# Patient Record
Sex: FEMALE | Race: WHITE | Employment: FULL TIME | URBAN - METROPOLITAN AREA
[De-identification: names, ages, dates, MRNs, and addresses within clinical notes are randomized per-mention and may not be internally consistent; named-entity substitution may affect disease eponyms.]

---

## 2017-07-24 ENCOUNTER — HOSPITAL ENCOUNTER (OUTPATIENT)
Dept: PHYSICAL THERAPY | Age: 37
Discharge: HOME | End: 2017-07-24

## 2017-07-24 NOTE — PROGRESS NOTES
Claudette Form  :  [id number] [facility],[address]  [address] [place] [address].  [phone]   [phone]        OUTPATIENT PHYSICAL THERAPY:{Note JMIQ:25589359} 2017      ICD-10: Treatment Diagnosis: ***  Precautions/Allergies:   Review of patient's allergies indicates no known allergies. Fall Risk Score: {NUMBERS 1-10:98091} (? 5 = High Risk)  MD Orders: Eval and Treat  MEDICAL/REFERRING DIAGNOSIS:  Acute intractable tension-type headache [G44.201]  Neck pain [M54.2]   DATE OF ONSET: ***  REFERRING PHYSICIAN: Gia Cottrell DO  RETURN PHYSICIAN APPOINTMENT: ***     INITIAL ASSESSMENT:  Ms. Paola Chambers presents ***    PROBLEM LIST (Impacting functional limitations):  1. {PT Problem List:92526744} INTERVENTIONS PLANNED:  1. {PT Interventions:91655809}   TREATMENT PLAN:  Effective Dates: *** TO ***. Frequency/Duration: { :05504} for { :42995::\"12 weeks\"}  GOALS: (Goals have been discussed and agreed upon with patient.)  Short-Term Functional Goals: Time Frame: ***  1. ***  Discharge Goals: Time Frame: ***  1. ***  Rehabilitation Potential For Stated Goals: { :70952::\"***\"}  Regarding Lawanda Anne's therapy, I certify that the treatment plan above will be carried out by a therapist or under their direction. Thank you for this referral,  Key Calles, PT, DPT       Referring Physician Signature: Val Olmos DO              Date                      HISTORY:   History of Present Injury/Illness (Reason for Referral):  ***  Past Medical History/Comorbidities:   Ms. Paola Chambers  has a past medical history of Ocular migraine. Ms. Paola Chambers  has a past surgical history that includes breast surgery procedure unlisted.   Social History/Living Environment:    ***  Prior Level of Function/Work/Activity:  ***  Dominant Side:         {LEFT RIGHT CAP:11405}  Other Clinical Tests:          ***  Previous Treatment Approaches:          ***  Personal Factors:    {PT Personal Factors:08753974}  Current Medications:    Current Outpatient Prescriptions:     tiZANidine (ZANAFLEX) 2 mg tablet, Take 1 Tab by mouth nightly as needed. , Disp: 30 Tab, Rfl: 0    nabumetone (RELAFEN) 500 mg tablet, Take 1 Tab by mouth two (2) times daily as needed for Pain., Disp: 60 Tab, Rfl: 1    desogestrel-ethinyl estradiol (APRI) 0.15-0.03 mg tab, Take 1 Tab by mouth daily. , Disp: , Rfl:     Cetirizine (ZYRTEC) 10 mg cap, Take 10 mg by mouth daily as needed. , Disp: , Rfl:    Date Last Reviewed:  7/24/2017   # of Personal Factors/Comorbidities that affect the Plan of Care: {Personal Factors/Comorbidities:01013224}   EXAMINATION:   Observation/Orthostatic Postural Assessment:          ***  Palpation:          ***  ROM:     Cervical       Flexion:      Extension:      R SB:      L SB:      R rotation:      L rotation:   Shoulder   Strength:     Cervical      Flexion:      Extension:      R SB:      L SB:      R rotation:      L rotation:   Shoulder    Special Tests:          ***  Neurological Screen:  {PT Neurological Elements:24999509}  Functional Mobility:         ***  Balance:          ***   Body Structures Involved:  1. {PT body Structures:91249648} Body Functions Affected:  1. {PT Body Functions:74067161} Activities and Participation Affected:  1. {PT Act/Part:32659853}   # of elements that affect the Plan of Care: {Examination:15994224}   CLINICAL PRESENTATION:   Presentation: {Presentation:11347374}   CLINICAL DECISION MAKING:   Outcome Measure: Tool Used: Neck Disability Index (NDI)  Score:  Initial: ***/50  Most Recent: X/50 (Date: -- )   Interpretation of Score: The Neck Disability Index is a revised form of the Oswestry Low Back Pain Index and is designed to measure the activities of daily living in person's with neck pain. Each section is scored on a 0-5 scale, 5 representing the greatest disability. The scores of each section are added together for a total score of 50.    Score 0 1-10 40 41-49 50   Modifier CH CI CJ CK CL CM CN     {PT/OT G-CODES:54367703}    Medical Necessity:   · {MEDICAL NECESSITY:96136369}  Reason for Services/Other Comments:  · {CONTINUATION:79267130}   Use of outcome tool(s) and clinical judgement create a POC that gives a: {Clinical Decision-Makin}   TREATMENT:   (In addition to Assessment/Re-Assessment sessions the following treatments were rendered)  THERAPEUTIC EXERCISE: (see below for minutes):  Exercises per grid below to improve {PT MOBILITY:43114353}. Required {NO/MIN/MOD/MAX:41792} {CUES:59907418} cues to {PT ALIGNMENT:63045467}. Progressed {desc:11427835} as indicated. MANUAL THERAPY: (see below for minutes): {MANUAL THERAPY:40632461} was utilized and necessary because of the patient's {MANUAL THERAPY 8:34392860}. MODALITIES: (see below for minutes): {PT MODALITIES:22287042}   MECHANICAL TRACTION: (see below for minutes): Traction was used due to the patient's {PT MECHANICAL TRACTION:72193371} in order to relieve pain in or originating from his spine. Date: ***       Modalities:        ***                        Therapeutic Exercise:        ***                                                Proprioceptive Activities:        ***                        Manual Therapy:        ***                Functional Activities:        ***                                HEP: ***  Treatment/Session Assessment:  ***. · Pain/ Symptoms: Initial:   *** Post Session:  *** ·   Compliance with Program/Exercises: { :733419::\"Will assess as treatment progresses\"}. · Recommendations/Intent for next treatment session: \"Next visit will focus on {MedNecessity:92422963}\".   Total Treatment Duration:        José Miguel Gudino, PT, DPT

## 2017-07-24 NOTE — PROGRESS NOTES
Ambulatory/Rehab Services H2 Model Falls Risk Assessment    Risk Factor Pts. ·   Confusion/Disorientation/Impulsivity  []    4 ·   Symptomatic Depression  []   2 ·   Altered Elimination  []   1 ·   Dizziness/Vertigo  []   1 ·   Gender (Male)  []   1 ·   Any administered antiepileptics (anticonvulsants):  []   2 ·   Any administered benzodiazepines:  []   1 ·   Visual Impairment (specify):  []   1 ·   Portable Oxygen Use  []   1 ·   Orthostatic ? BP  []   1 ·   History of Recent Falls (within 3 mos.)  []   5     Ability to Rise from Chair (choose one) Pts. ·   Ability to rise in a single movement  []   0 ·   Pushes up, successful in one attempt  []   1 ·   Multiple attempts, but successful  []   3 ·   Unable to rise without assistance  []   4   Total: (5 or greater = High Risk) ***     Falls Prevention Plan:   []                Physical Limitations to Exercise (specify):   []                Mobility Assistance Device (type):   []                Exercise/Equipment Adaptation (specify):    ©2010 [facility] of [address] Patent #[id number].  Federal Law prohibits the replication, distribution or use without written permission from [facility] [employer]

## 2017-07-28 ENCOUNTER — APPOINTMENT (OUTPATIENT)
Dept: PHYSICAL THERAPY | Age: 37
End: 2017-07-28

## 2021-08-25 ENCOUNTER — HOSPITAL ENCOUNTER (OUTPATIENT)
Dept: PHYSICAL THERAPY | Age: 41
Discharge: HOME | End: 2021-08-25
Payer: COMMERCIAL

## 2021-08-25 DIAGNOSIS — M75.41 IMPINGEMENT SYNDROME OF RIGHT SHOULDER: ICD-10-CM

## 2021-08-25 DIAGNOSIS — M75.42 IMPINGEMENT SYNDROME OF LEFT SHOULDER: ICD-10-CM

## 2021-08-25 PROCEDURE — 97110 THERAPEUTIC EXERCISES: CPT

## 2021-08-25 PROCEDURE — 97162 PT EVAL MOD COMPLEX 30 MIN: CPT

## 2021-08-25 NOTE — PROGRESS NOTES
Karen Hassan  : 1980  Primary: Christine Wick Of Sari Monge*  Secondary:  Yaakov Galvan @ [address]  [address]  [phone]   [phone]      OUTPATIENT PHYSICAL THERAPY: Daily Treatment Note  2021   ICD-10: Treatment Diagnosis: Pain in right arm (M79.601), Pain in left arm (M79.602), Stiffness of right shoulder, not elsewhere classified (M25.611), Stiffness of left shoulder, not elsewhere classified (M25.612), Pain in right upper arm (M79.621) and Pain in left upper arm (B66.111)  MEDICAL/REFERRING DIAGNOSIS:  Impingement syndrome of right shoulder [M75.41]  Impingement syndrome of left shoulder [M75.42]    TREATMENT PLAN:  Effective Dates: 2021 TO 10/24/2021 (60 days). Frequency/Duration: 3 times a week for 60 Days  GOALS: (Goals have been discussed and agreed upon with patient.)  Short-Term Functional Goals: Time Frame: 3 weeks  1. Independent performance of home exercise program  2. Ability to reach behind back with either arm to mid thorax without pain for personal care/dressing/washing back. Discharge Goals: Time Frame: 7 weeks  1. Pt to demonstrate ability to reach/lift overhead with either arm with 10# for reaching to tall shelves   2. Pt to demonstrate sleep on either side without pain interruption. 3. Pt to demonstrate Quick-DASH score < 13 to reflect return to normal function  Rehabilitation Potential For Stated Goals: Good  _________________________________________________________________________  Pre-treatment Symptoms/Complaints:  See initial evaluation report  Pain: Initial: 10 Post Session:  No incrase/10   Medications Last Reviewed:  2021  Updated Objective Findings:  Below measures from initial evaluation unless otherwise noted. TREATMENT:   THERAPEUTIC ACTIVITY: ( see below for minutes): Therapeutic activities per grid below to improve mobility, strength, balance and coordination. Required minimal visual, verbal, manual and tactile cues to improve independence and safety with daily activities . THERAPEUTIC EXERCISE: (see below for minutes):  Exercises per grid below to improve mobility, strength, balance and coordination. Required minimal verbal and manual cues to promote proper body alignment, promote proper body posture and promote proper body mechanics. Progressed resistance, range, repetitions and complexity of movement as indicated. MANUAL THERAPY: (see below for minutes): Joint mobilization and Soft tissue mobilization was utilized and necessary because of the patient's restricted joint motion, painful spasm, loss of articular motion and restricted motion of soft tissue. MODALITIES: (see below for minutes):      to decrease pain    SELF CARE: (see below for minutes): Procedure(s) (per grid) utilized to improve and/or restore self-care/home management as related to dressing, bathing and grooming. Required minimal verbal cueing to facilitate activities of daily living skills and compensatory activities. Date: 8/25/2021 (visit 1)       Modalities:                                Therapeutic Exercise: 23 min        Shoulder flexion stretch bilat: supine 10 x 10\"        Shoulder ER stretch (hands behind head) 10 x 10\"        Posterior cuff stretch: 2 x 30\" left/ right - sitting        Sitting shoulder ER W's: 2 x 10        Written HEP - see notes - all ex's reviewed               Proprioceptive Activities:                                Manual Therapy:                        Therapeutic Activities:                                  HEP:Access Code: [email or web address]  URL: [email or web address]  Date: 08/25/2021  Prepared by: Pauline Willoughby    Exercises  Supine Shoulder Flexion Extension Full Range AROM - 2 x daily - 10 reps - 10 second hold  Supine Chest Stretch with Elbows Bent - 2 x daily - 3 sets - 10 reps - 5 hold  Standing Shoulder Posterior Capsule Stretch - 2 x daily - 5 reps - 30 hold  Shoulder W - External Rotation with Resistance - 2 x daily - 3 sets - 10 reps      MedBridge Portal  Treatment/Session Summary:    · Response to Treatment:  Patient is independent with performance of above described home program.  · Communication/Consultation:  None today  · Equipment provided today:  Theraband red CLX strip for home exercise program  · Recommendations/Intent for next treatment session: Next visit will focus on End range shoulder ROM, rotator cuff endurance and stability training, HEP progression, Estim as needed for pain control.     Total Treatment Billable Duration:  40 min  PT Patient Time In/Time Out  Time In: 6947  Time Out: [address], PT    Future Appointments   Date Time Provider Elfego Salmeron   8/31/2021  3:30 PM Galileo Snow, PT [facility]   9/2/2021  3:30 PM Galileo Snow, PT [facility]   9/3/2021  3:30 PM Galileo Snow, PT [facility]   9/8/2021  3:30 PM Galileo Snow, PT [facility]   9/10/2021  3:30 PM Galileo Snow, PT [facility]   9/13/2021  3:30 PM Galileo Snow, PT [facility]   9/15/2021  3:30 PM Galileo Snow, PT [facility]   9/17/2021  3:30 PM Galileo Snow, PT [facility]   9/20/2021  3:30 PM Galileo Snow, PT [facility]   9/22/2021  3:30 PM Galileo Snow, PT [facility]   9/24/2021  3:30 PM Galileo Snow, PT [facility]   9/27/2021  3:30 PM Galileo Snow, PT [facility]   9/29/2021  3:30 PM Galileo Snow, PT [facility]   10/1/2021  3:30 PM Mariann Aguirre, PT [facility]

## 2021-08-25 NOTE — THERAPY EVALUATION
Esme Elena  : 1980 [address], [address] [place] [address]  [phone]   [phone]        OUTPATIENT PHYSICAL THERAPY:Initial Assessment 2021         ICD-10: Treatment Diagnosis: Pain in right arm (M79.601), Pain in left arm (M79.602), Stiffness of right shoulder, not elsewhere classified (M25.611), Stiffness of left shoulder, not elsewhere classified (M25.612), Pain in right upper arm (M79.621) and Pain in left upper arm (V78.494)  Precautions/Allergies:   Patient has no known allergies. Fall Risk Score:     Ambulatory/Rehab Services H2 Model Falls Risk Assessment    Risk Factors:       No Risk Factors Identified Ability to Rise from Chair:       (0)  Ability to rise in a single movement    Falls Prevention Plan:       No modifications necessary   Total: (5 or greater = High Risk): 0     [facility] of [employer]. All Rights Reserved. [facility] Patent #[id number]. Federal Law prohibits the replication, distribution or use without written permission from [facility] [employer]     MD Orders: Evaluate and treat: L and R shoulder impingement MEDICAL/REFERRING DIAGNOSIS:  Impingement syndrome of right shoulder [M75.41]  Impingement syndrome of left shoulder [M75.42]   DATE OF ONSET:   REFERRING PHYSICIAN: Rosalie Gaming DO  RETURN PHYSICIAN APPOINTMENT: TBD     INITIAL ASSESSMENT:  Ms. Shy Posey presents with bilateral shoulder pain and stiffness that hinders her ability to reach overhead and behind back with either arm to complete ADL's and personal care activities. She requires skilled physical therapy intervention to restore normal shoulder function and to teach self care strategies to prevent re-injury. PROBLEM LIST (Impacting functional limitations):  1. Decreased Strength  2. Increased Pain  3. Decreased Activity Tolerance  4. Decreased Flexibility/Joint Mobility  5.  Decreased [place] with Home Exercise Program INTERVENTIONS PLANNED:  1. Electrical Stimulation  2. Home Exercise Program (HEP)  3. Range of Motion (ROM)  4. Therapeutic Activites  5. Therapeutic Exercise/Strengthening    TREATMENT PLAN:  Effective Dates: 8/25/2021 TO 10/24/2021 (60 days). Frequency/Duration: 3 times a week for 60 Days  GOALS: (Goals have been discussed and agreed upon with patient.)  Short-Term Functional Goals: Time Frame: 3 weeks  1. Independent performance of home exercise program  2. Ability to reach behind back with either arm to mid thorax without pain for personal care/dressing/washing back. Discharge Goals: Time Frame: 7 weeks  1. Pt to demonstrate ability to reach/lift overhead with either arm with 10# for reaching to tall shelves   2. Pt to demonstrate sleep on either side without pain interruption. 3. Pt to demonstrate Quick-DASH score < 13 to reflect return to normal function  Rehabilitation Potential For Stated Goals: Good  Regarding Isatu Aparicio Dayana's therapy, I certify that the treatment plan above will be carried out by a therapist or under their direction. Thank you for this referral,  Oly Padilla, PT       Referring Physician Signature: Karime Palomino DO              Date                      HISTORY:   History of Present Injury/Illness (Reason for Referral):  Pt reports a history of having initially developed right shoulder pain associated with lying on her right side to rest in April 2021 - this progressed into stiffness over the next month, caused her to shift her sleep postures and led to her developing shoulder pain in her left side not. Currently she experiences bilateral shoulder pain as well as stiffness which hinders her ability to reach behind her back with either arm to wash. She also notices shoulder pressure/tightnes with simple exercises such as pushups. She has no history of shoulder trauma and denies any other contributing factors.  Her goals are to regain normal shoulder mobility without pain   Past Medical History/Comorbidities:   Ms. Carline Alba  has a past medical history of Ocular migraine. Ms. Carline Alba  has a past surgical history that includes pr breast surgery procedure unlisted. Social History/Living Environment:     Pt , one child at home  Prior Level of Function/Work/Activity:  Normal shoulder function. Runs for fitness, Her work is sedentary (desk job). Dominant Side:         RIGHT  Current Medications:    Current Outpatient Medications:     diclofenac EC (VOLTAREN) 75 mg EC tablet, Take 1 Tablet by mouth two (2) times daily as needed for Pain for up to 21 days. , Disp: 42 Tablet, Rfl: 0    crisaborole (EUCRISA) 2 % oint, by Apply Externally route as needed. , Disp: , Rfl:     triamcinolone acetonide (KENALOG) 0.1 % topical cream, Apply  to affected area two (2) times a day. use thin layer (Patient taking differently: Apply  to affected area two (2) times daily as needed. use thin layer), Disp: 45 g, Rfl: 1    Cetirizine (ZYRTEC) 10 mg cap, Take 10 mg by mouth daily as needed. , Disp: , Rfl:    Date Last Reviewed:  8/25/2021   # of Personal Factors/Comorbidities that affect the Plan of Care: 1-2: MODERATE COMPLEXITY   EXAMINATION:   Observation/Orthostatic Postural Assessment:    Bilateral scapular protraction posture. Palpation:    Humeral head tenderness bilaterally  ROM:    Left shoulder: flexion = 148 deg active (pain), 142 deg passive (pain), abduction = 156 deg active, External rotaiton = 67 deg active (arm at side0, 50 deg passive (arm abducted, pain at end range). IR = 68 deg. Apley reach tests: T1 ER, T6 IR w/ pain. Right shoulder: flexion = 155 deg active (pain), 160 deg passive (pain), abduction = 167 deg active, ER = 78 deg active, 68 deg passive (pain). Apley reach tests: T1/T9 (pain limited).    Strength:   Hand held dynamometer: flexion = 18.5# left, 17.9# right, External rotation: 9.7# left, 12. 5# right, Internal rotation: 10.5# left, 16.1 # right.  Special Tests:    + left and right: Neer's and Copeland Aristeo impingement test. Neg. Sulcus and load-shift tests. Neurological Screen:  Grossly intact light touch sensation bilat. UE's  Functional Mobility:   Normal performance of supine-sit-stand transfers and walking gait. Balance:    Normal static standing balance   Body Structures Involved:  1. Nerves  2. Bones  3. Joints  4. Muscles  5. Ligaments Body Functions Affected:  1. Sensory/Pain  2. Neuromusculoskeletal  3. Movement Related Activities and Participation Affected:  1. General Tasks and Demands  2. Mobility  3. Self Care  4. Domestic Life  5. Interpersonal Interactions and Relationships  6. Community, Social and [place]   # of elements that affect the Plan of Care: 3: MODERATE COMPLEXITY   CLINICAL PRESENTATION:   Presentation: Evolving clinical presentation with changing clinical characteristics: MODERATE COMPLEXITY   CLINICAL DECISION MAKING:   Tool Used: Disabilities of the Arm, Shoulder and Hand (DASH) Questionnaire - Quick Version  Score:  Initial: 25/55  Most Recent: X/55 (Date: -- )   Interpretation of Score: The DASH is designed to measure the activities of daily living in person's with upper extremity dysfunction or pain. Each section is scored on a 1-5 scale, 5 representing the greatest disability. The scores of each section are added together for a total score of 55. Medical Necessity:   · Patient is expected to demonstrate progress in strength, range of motion, balance and coordination  ·  to allow return of normal overhead lfting and normal overhead and behind back reaching for ADL's and personal care. ·   Reason for Services/Other Comments:  · Patient has demonstrated an improvement in functional level by independent performance of HEP.    · .   Use of outcome tool(s) and clinical judgement create a POC that gives a: Questionable prediction of patient's progress: MODERATE COMPLEXITY     Total Treatment Duration: 40 merle Mejia, PT

## 2021-08-30 ENCOUNTER — HOSPITAL ENCOUNTER (OUTPATIENT)
Dept: PHYSICAL THERAPY | Age: 41
Discharge: HOME | End: 2021-08-30
Payer: COMMERCIAL

## 2021-08-30 PROCEDURE — 97140 MANUAL THERAPY 1/> REGIONS: CPT

## 2021-08-30 PROCEDURE — 97110 THERAPEUTIC EXERCISES: CPT

## 2021-08-30 NOTE — PROGRESS NOTES
Edmundo Andre  : 1980  Primary: Errol [facility] frannie Monge*  Secondary:  [address] [facility] @ [address], [address] [place] [address]  [phone]   [phone]      OUTPATIENT PHYSICAL THERAPY: Daily Treatment Note  2021   ICD-10: Treatment Diagnosis: Pain in right arm (M79.601), Pain in left arm (M79.602), Stiffness of right shoulder, not elsewhere classified (M25.611), Stiffness of left shoulder, not elsewhere classified (M25.612), Pain in right upper arm (M79.621) and Pain in left upper arm (V86.361)  MEDICAL/REFERRING DIAGNOSIS:  Impingement syndrome of right shoulder [M75.41]  Impingement syndrome of left shoulder [M75.42]    TREATMENT PLAN:  Effective Dates: 2021 TO 10/24/2021 (60 days). Frequency/Duration: 3 times a week for 60 Days  GOALS: (Goals have been discussed and agreed upon with patient.)  Short-Term Functional Goals: Time Frame: 3 weeks  1. Independent performance of home exercise program  2. Ability to reach behind back with either arm to mid thorax without pain for personal care/dressing/washing back. Discharge Goals: Time Frame: 7 weeks  1. Pt to demonstrate ability to reach/lift overhead with either arm with 10# for reaching to tall shelves   2. Pt to demonstrate sleep on either side without pain interruption. 3. Pt to demonstrate Quick-DASH score < 13 to reflect return to normal function  Rehabilitation Potential For Stated Goals: Good  _________________________________________________________________________  Pre-treatment Symptoms/Complaints: Sore shoulders from home exercises. Notes that the external rotation stretch with her hands behind her head reproduced shoulder soreness. Pain: Initial: /10 Post Session:  No increase/10   Medications Last Reviewed:  2021  Updated Objective Findings:  Below measures from initial evaluation unless otherwise noted.       TREATMENT:   THERAPEUTIC ACTIVITY: ( see below for minutes): Therapeutic activities per grid below to improve mobility, strength, balance and coordination. Required minimal visual, verbal, manual and tactile cues to improve independence and safety with daily activities . THERAPEUTIC EXERCISE: (see below for minutes):  Exercises per grid below to improve mobility, strength, balance and coordination. Required minimal verbal and manual cues to promote proper body alignment, promote proper body posture and promote proper body mechanics. Progressed resistance, range, repetitions and complexity of movement as indicated. MANUAL THERAPY: (see below for minutes): Joint mobilization and Soft tissue mobilization was utilized and necessary because of the patient's restricted joint motion, painful spasm, loss of articular motion and restricted motion of soft tissue. MODALITIES: (see below for minutes):      to decrease pain    SELF CARE: (see below for minutes): Procedure(s) (per grid) utilized to improve and/or restore self-care/home management as related to dressing, bathing and grooming. Required minimal verbal cueing to facilitate activities of daily living skills and compensatory activities. Date: 8/25/2021 (visit 1) 8/30/2021 (visit 2)      Modalities:                                Therapeutic Exercise: 23 min 35 min       Shoulder flexion stretch bilat: supine 10 x 10\" UBE: 8 min (4 min each forw/bkwd), level 2 resistance. Shoulder ER stretch (hands behind head) 10 x 10\" L and R shoulder - finger walk up wall flexion stretches: 5 x 10\" each. Posterior cuff stretch: 2 x 30\" left/ right - sitting Supine active full range flexion bilat - 30x       Sitting shoulder ER W's: 2 x 10 L/R shoulder PROM's- end range ER and IR stretches w/30\" holds x 2       Written HEP - see notes - all ex's reviewed Supine bilat angel luis. ER (elbows at side) = red tband x 30        L/R angel luis. IR w/ red band resist x 30 each        Supine bilat angel luis. ER stretch (partial range) w. Hands behind head 10 x 5\"                               Proprioceptive Activities:                                Manual Therapy:  8 min        GH joint L and R individually: long axis traction, GH post. Glide gr. 3-4        Sleeper stretch: L/R : 10 x 5\"      Therapeutic Activities:                                  HEP:Revised - see notes below:    Exercises  Supine Shoulder Flexion Extension Full Range AROM - 2 x daily - 10 reps - 10 second hold  Sidelying sleeper stretch (L and R): 1x/day, 10 x 5\" each side. Standing Shoulder Posterior Capsule Stretch - 2 x daily - 5 reps - 30 hold  Shoulder W - External Rotation with Resistance - 2 x daily - 3 sets - 10 reps      [employer] Portal  Treatment/Session Summary:    · Response to Treatment:  External rotation stretching reproduces end range pain in each shoulder - modified HEP to address this with ER stretches on hold until next visit. · Communication/Consultation:  None today  · Equipment provided today:  Theraband red CLX strip for home exercise program  · Recommendations/Intent for next treatment session: Next visit will focus on end range shoulder ROM, rotator cuff endurance and stability training, HEP progression, Estim as needed for pain control.     Total Treatment Billable Duration:  45 min  PT Patient Time In/Time Out  Time In: 0930  Time Out: 701 [facility], PT    Future Appointments   Date Time Provider Elfego Salmeron   9/2/2021  9:30 AM Elisafranc RamosGlades, PT [facility]   9/8/2021  8:00 AM Elisa Glades, PT [facility]   9/10/2021  8:00 AM Elisa Glades, PT [facility]   9/13/2021 10:15 AM Elisa Glades, PT [facility]   9/15/2021  8:00 AM Elisa Glades, PT [facility]   9/17/2021  9:30 AM Elisa Glades, PT [facility]   9/20/2021  9:30 AM Elisa Glades, PT [facility]   9/22/2021 11:00 AM Elisa Glades, PT [facility]   9/24/2021  9:30 AM [facility]   9/27/2021  9:30 AM [facility]   9/29/2021  8:00 AM Bryan Urena PT [facility]   10/1/2021 10:15 AM Dustin Lopez, [facility]

## 2021-08-31 ENCOUNTER — APPOINTMENT (OUTPATIENT)
Dept: PHYSICAL THERAPY | Age: 41
End: 2021-08-31
Payer: COMMERCIAL

## 2021-09-02 ENCOUNTER — HOSPITAL ENCOUNTER (OUTPATIENT)
Dept: PHYSICAL THERAPY | Age: 41
Discharge: HOME | End: 2021-09-02
Payer: COMMERCIAL

## 2021-09-02 PROCEDURE — 97110 THERAPEUTIC EXERCISES: CPT

## 2021-09-02 PROCEDURE — 97140 MANUAL THERAPY 1/> REGIONS: CPT

## 2021-09-02 NOTE — PROGRESS NOTES
[facility]  : 1980  Primary: Enrike Monge*  Secondary:  [address] [facility] @ [address], [address] [place] [address]  [phone]   [phone]      OUTPATIENT PHYSICAL THERAPY: Daily Treatment Note  2021   ICD-10: Treatment Diagnosis: Pain in right arm (M79.601), Pain in left arm (M79.602), Stiffness of right shoulder, not elsewhere classified (M25.611), Stiffness of left shoulder, not elsewhere classified (M25.612), Pain in right upper arm (M79.621) and Pain in left upper arm (I41.251)  MEDICAL/REFERRING DIAGNOSIS:  Impingement syndrome of right shoulder [M75.41]  Impingement syndrome of left shoulder [M75.42]    TREATMENT PLAN:  Effective Dates: 2021 TO 10/24/2021 (60 days). Frequency/Duration: 3 times a week for 60 Days  GOALS: (Goals have been discussed and agreed upon with patient.)  Short-Term Functional Goals: Time Frame: 3 weeks  1. Independent performance of home exercise program  2. Ability to reach behind back with either arm to mid thorax without pain for personal care/dressing/washing back. Discharge Goals: Time Frame: 7 weeks  1. Pt to demonstrate ability to reach/lift overhead with either arm with 10# for reaching to tall shelves   2. Pt to demonstrate sleep on either side without pain interruption. 3. Pt to demonstrate Quick-DASH score < 13 to reflect return to normal function  Rehabilitation Potential For Stated Goals: Good  _________________________________________________________________________  Pre-treatment Symptoms/Complaints: Not much change in shoulder so far. Some of her exercises are easier to do with her left arm than her right and vice versa. Pain: Initial: 4/10 Post Session:  No increase/10   Medications Last Reviewed:  2021  Updated Objective Findings:  Below measures from initial evaluation unless otherwise noted.       TREATMENT:   THERAPEUTIC ACTIVITY: ( see below for minutes): Therapeutic activities per grid below to improve mobility, strength, balance and coordination. Required minimal visual, verbal, manual and tactile cues to improve independence and safety with daily activities . THERAPEUTIC EXERCISE: (see below for minutes):  Exercises per grid below to improve mobility, strength, balance and coordination. Required minimal verbal and manual cues to promote proper body alignment, promote proper body posture and promote proper body mechanics. Progressed resistance, range, repetitions and complexity of movement as indicated. MANUAL THERAPY: (see below for minutes): Joint mobilization and Soft tissue mobilization was utilized and necessary because of the patient's restricted joint motion, painful spasm, loss of articular motion and restricted motion of soft tissue. MODALITIES: (see below for minutes):      to decrease pain    SELF CARE: (see below for minutes): Procedure(s) (per grid) utilized to improve and/or restore self-care/home management as related to dressing, bathing and grooming. Required minimal verbal cueing to facilitate activities of daily living skills and compensatory activities. Date: 8/25/2021 (visit 1) 8/30/2021 (visit 2) 9/2/2021 (visit 3)     Modalities:                                Therapeutic Exercise: 23 min 35 min 32 min      Shoulder flexion stretch bilat: supine 10 x 10\" UBE: 8 min (4 min each forw/bkwd), level 2 resistance. UBE: 6 min, leel 2, (3 miin each fow/bkwd)      Shoulder ER stretch (hands behind head) 10 x 10\" L and R shoulder - finger walk up wall flexion stretches: 5 x 10\" each. Beronica supine ER bilat (W's): 3 x 10 green.        Posterior cuff stretch: 2 x 30\" left/ right - sitting Supine active full range flexion bilat - 30x Prone shoulder flexions L/R 2 x 10 @ 1# each      Sitting shoulder ER W's: 2 x 10 L/R shoulder PROM's- end range ER and IR stretches w/30\" holds x 2 L/R shoulder PROM's- end range ER and IR stretches       Written HEP - see notes - all ex's reviewed Supine bilat angel luis. ER (elbows at side) = red tband x 30 Prone: shoulder horiz. Abd: 3 x 10 each L/R       L/R angel luis. IR w/ red band resist x 30 each Table pushups: 2 x 12        Supine bilat angel luis. ER stretch (partial range) w. Hands behind head 10 x 5\"                               Proprioceptive Activities:                                Manual Therapy:  8 min 12 min       GH joint L and R individually: long axis traction, GH post. Glide gr. 3-4 GH joints: inf, post glides gr. 3-4, w/ end range IR /ER stretches       Sleeper stretch: L/R : 10 x 5\"      Therapeutic Activities:                                  HEP:Revised - see notes below:    Exercises  Supine Shoulder Flexion Extension Full Range AROM - 2 x daily - 10 reps - 10 second hold  Sidelying sleeper stretch (L and R): 1x/day, 10 x 5\" each side. Standing Shoulder Posterior Capsule Stretch - 2 x daily - 5 reps - 30 hold  Shoulder W - External Rotation with Resistance - 2 x daily - 3 sets - 10 reps      [facility]  Treatment/Session Summary:    · Response to Treatment: Good:  Transition into prone progression exercises to address posterior rotator cuff strength /endurance deficits. · Communication/Consultation:  None today  · Equipment provided today:  None today  · Recommendations/Intent for next treatment session: Next visit will focus on end range shoulder ROM, rotator cuff endurance and stability training, HEP progression.     Total Treatment Billable Duration:  45 min  PT Patient Time In/Time Out  Time In: 7211  Time Out: 207 [facility], PT    Future Appointments   Date Time Provider Elfego Salmeron   9/10/2021  8:00 AM Cain Wu PT [facility]   9/13/2021 10:15 AM Cain Wu, PT [facility]   9/15/2021 11:00 AM Cain Wu PT [facility]   9/17/2021  9:30 AM Cain Wu PT [facility]   9/20/2021  9:30 AM Patricio Keller, PT [facility] 9/22/2021 11:00 AM Thedolaith [place], PT [facility]   9/24/2021  9:30 AM Thedore [place], PT [facility]   9/27/2021  9:30 AM Thedolaith [place], PT [facility]   9/29/2021  8:00 AM Theade [place], PT [facility]   10/1/2021 10:15 AM [facility] Lopez[facility]

## 2021-09-03 ENCOUNTER — APPOINTMENT (OUTPATIENT)
Dept: PHYSICAL THERAPY | Age: 41
End: 2021-09-03
Payer: COMMERCIAL

## 2021-09-08 ENCOUNTER — APPOINTMENT (OUTPATIENT)
Dept: PHYSICAL THERAPY | Age: 41
End: 2021-09-08
Payer: COMMERCIAL

## 2021-09-10 ENCOUNTER — APPOINTMENT (OUTPATIENT)
Dept: PHYSICAL THERAPY | Age: 41
End: 2021-09-10
Payer: COMMERCIAL

## 2021-09-13 ENCOUNTER — HOSPITAL ENCOUNTER (OUTPATIENT)
Dept: PHYSICAL THERAPY | Age: 41
Discharge: HOME | End: 2021-09-13
Payer: COMMERCIAL

## 2021-09-13 PROCEDURE — 97110 THERAPEUTIC EXERCISES: CPT

## 2021-09-13 NOTE — PROGRESS NOTES
Shailesh Mesa  : 1980  Primary: Khurram Coleman Of Sari Monge*  Secondary:  [address] @ [address], [place].  [phone]   [phone]      OUTPATIENT PHYSICAL THERAPY: Daily Treatment Note  2021   ICD-10: Treatment Diagnosis: Pain in right arm (M79.601), Pain in left arm (M79.602), Stiffness of right shoulder, not elsewhere classified (M25.611), Stiffness of left shoulder, not elsewhere classified (M25.612), Pain in right upper arm (M79.621) and Pain in left upper arm (V06.854)  MEDICAL/REFERRING DIAGNOSIS:  Impingement syndrome of right shoulder [M75.41]  Impingement syndrome of left shoulder [M75.42]    TREATMENT PLAN:  Effective Dates: 2021 TO 10/24/2021 (60 days). Frequency/Duration: 3 times a week for 60 Days  GOALS: (Goals have been discussed and agreed upon with patient.)  Short-Term Functional Goals: Time Frame: 3 weeks  1. Independent performance of home exercise program  2. Ability to reach behind back with either arm to mid thorax without pain for personal care/dressing/washing back. Discharge Goals: Time Frame: 7 weeks  1. Pt to demonstrate ability to reach/lift overhead with either arm with 10# for reaching to tall shelves   2. Pt to demonstrate sleep on either side without pain interruption. 3. Pt to demonstrate Quick-DASH score < 13 to reflect return to normal function  Rehabilitation Potential For Stated Goals: Good  _________________________________________________________________________  Pre-treatment Symptoms/Complaints: Right shoulder is better, but left continues to be sore/stiff. Taking antiinflammatory, also to get laser treatments on left shoulder. Pain: Initial: 4/10 Post Session:  No increase/10   Medications Last Reviewed:  2021  Updated Objective Findings:  Shoulder IR w/ sleeper stretch (against horizontal: left = 40 deg, right = 30 deg.  Supine angel luis. ER (abducted): left = 60 deg, right = 95 deg. TREATMENT:   THERAPEUTIC ACTIVITY: ( see below for minutes): Therapeutic activities per grid below to improve mobility, strength, balance and coordination. Required minimal visual, verbal, manual and tactile cues to improve independence and safety with daily activities . THERAPEUTIC EXERCISE: (see below for minutes):  Exercises per grid below to improve mobility, strength, balance and coordination. Required minimal verbal and manual cues to promote proper body alignment, promote proper body posture and promote proper body mechanics. Progressed resistance, range, repetitions and complexity of movement as indicated. MANUAL THERAPY: (see below for minutes): Joint mobilization and Soft tissue mobilization was utilized and necessary because of the patient's restricted joint motion, painful spasm, loss of articular motion and restricted motion of soft tissue. MODALITIES: (see below for minutes):      to decrease pain    SELF CARE: (see below for minutes): Procedure(s) (per grid) utilized to improve and/or restore self-care/home management as related to dressing, bathing and grooming. Required minimal verbal cueing to facilitate activities of daily living skills and compensatory activities. Date: 8/25/2021 (visit 1) 8/30/2021 (visit 2) 9/2/2021 (visit 3) 9/13/2021 (visit 4)    Modalities:                                Therapeutic Exercise: 23 min 35 min 32 min 38 min     Shoulder flexion stretch bilat: supine 10 x 10\" UBE: 8 min (4 min each forw/bkwd), level 2 resistance. UBE: 6 min, leel 2, (3 miin each fow/bkwd) Shoulder flexion AAROM 10 x 5\"(fingers laced)      Shoulder ER stretch (hands behind head) 10 x 10\" L and R shoulder - finger walk up wall flexion stretches: 5 x 10\" each. Beronica supine ER bilat (W's): 3 x 10 green.   ShoulderIR sleeper stretch: 20 sec x  10 each L/R     Posterior cuff stretch: 2 x 30\" left/ right - sitting Supine active full range flexion bilat - 30x Prone shoulder flexions L/R 2 x 10 @ 1# each L/R shoulder PROM's: ER/IR each side. 8 min     Sitting shoulder ER W's: 2 x 10 L/R shoulder PROM's- end range ER and IR stretches w/30\" holds x 2 L/R shoulder PROM's- end range ER and IR stretches  Seated: shoulder W's w/ green band: 3 x 10     Written HEP - see notes - all ex's reviewed Supine bilat angel luis. ER (elbows at side) = red tband x 30 Prone: shoulder horiz. Abd: 3 x 10 each L/R Shrugs: w/5#: 30x      L/R angel luis. IR w/ red band resist x 30 each Table pushups: 2 x 12  Table pushups: 3 x 10      Supine bilat angel luis. ER stretch (partial range) w. Hands behind head 10 x 5\"   Rows: black tubinx bilat                            Proprioceptive Activities:                                Manual Therapy:  8 min 12 min 6 min      GH joint L and R individually: long axis traction, GH post. Glide gr. 3-4 GH joints: inf, post glides gr. 3-4, w/ end range IR /ER stretches GH inferior glides gr. 3-4      Sleeper stretch: L/R : 10 x 5\"      Therapeutic Activities:                                  HEP:Revised - see notes below:    Exercises  Supine Shoulder Flexion Extension Full Range AROM - 2 x daily - 10 reps - 10 second hold  Sidelying sleeper stretch (L and R): 1x/day, 10 x 5\" each side. Standing Shoulder Posterior Capsule Stretch - 2 x daily - 5 reps - 30 hold  Shoulder W - External Rotation with Resistance - 2 x daily - 3 sets - 10 reps      [employer] Portal  Treatment/Session Summary:    · Response to Treatment: Fair: Less than expected gains in shoulder ER/IR L shoulder so far. Scapular winging present - more work on scapulothoracic training today to address this. · Communication/Consultation:  None today  · Equipment provided today:  None today  · Recommendations/Intent for next treatment session: Next visit will focus on end range shoulder ROM, rotator cuff endurance and stability training, scapular control, HEP progression.     Total Treatment Billable Duration:  45 min  PT Patient Time In/Time Out  Time In: 1015  Time Out: [address] [facility] [address], PT    Future Appointments   Date Time Provider Elfego Faviola   9/15/2021 11:00 AM Thedore Learned, PT [facility]   9/17/2021  9:30 AM Thedore Learned, PT [facility]   9/20/2021  9:30 AM Thedore Learned, PT [facility]   9/22/2021 11:00 AM Thedore Learned, PT [facility]   9/24/2021  9:30 AM Thedore Learned, PT [facility]   9/27/2021  9:30 AM Thedore Learned, PT [facility]   9/29/2021  8:00 AM Thedore Learned, PT [facility]   10/1/2021 10:15 AM Thedore Learned, PT [facility]   10/4/2021 11:00 AM Thedore Learned, PT [facility]   10/6/2021 11:00 AM Thedore Learned, PT [facility]   10/8/2021 11:00 AM Thedore Learned, PT [facility]   10/11/2021 11:00 AM Thedore Learned, PT [facility]   10/13/2021 11:00 AM Thedore Learned, PT [facility]   10/15/2021 11:00 AM Thedore Learned, PT [facility]   10/18/2021 11:00 AM Thedore Learned, PT [facility]   10/20/2021 11:00 AM Thedore Learned, PT [facility]   10/22/2021 11:00 AM Thedore Learned, PT [facility]   10/25/2021 11:00 AM Thedore Learned, PT [facility]   10/27/2021 11:00 AM [facility]   10/29/2021 11:00 AM Dustin Aguirrep, PT [facility]

## 2021-09-15 ENCOUNTER — APPOINTMENT (OUTPATIENT)
Dept: PHYSICAL THERAPY | Age: 41
End: 2021-09-15
Payer: COMMERCIAL

## 2021-09-17 ENCOUNTER — HOSPITAL ENCOUNTER (OUTPATIENT)
Dept: PHYSICAL THERAPY | Age: 41
Discharge: HOME | End: 2021-09-17
Payer: COMMERCIAL

## 2021-09-17 NOTE — PROGRESS NOTES
OCCUPATIONAL THERAPY PROGRESS NOTE    Date:  12/15/2020   Initial Evaluation Date: 2020                          Evaluating Therapist: Shameka Rice     Patient Name:  David Cruz                  :  1979     Restrictions/Precautions:  ROM as tolerated; modalities prn, low fall risk  Diagnosis:S62.613B (ICD-10-CM) - Displaced fracture of proximal phalanx of left middle finger, initial encounter for open fracture   Excisional debridement of left palm, index and middle finger with open reduction internal fixation of left middle finger proximal phalanx fracture with middle finger A2 pulley repair. Insurance/Certification information:[facility]  Claim # [id number]  Referring Practitioner: Dr. Rick Arndt  Date of Surgery/Injury: 2020  Plan of care signed (Y/N): Y    Visit# / total visits:  - (used 18 visits to date) 3x/week for 6 weeks ( total of 18 visits) until 2021    COVID-19 Screening completed upon entering facility and patient cleared for treatment today. Pt followed all protocols set forth by [facility] for Outpatient services throughout therapy session. Patient has been made aware of all new hygiene protocols due to COVID-19 set forth by [facility] Outpatient services and agrees to abide by all protocols. Pain Level:  discomfort    Subjective: Pt states no new changes     Objective:  Updated POC to be completed by  visit. INTERVENTION: COMPLETED: SPECIFICS/COMMENTS:   Modality:     MHP x Hand wrapped in during wound soak for soft tissue warm-up   fluiotherapy  For soft tissue warm-up while intermittently moving his wrist and fingers. For desensitization also. 20 mins   Ice   End of session to decrease pain.     AROM:     Towel scrunches x 5 mins to LUE Pt no-show for today's appointment. On follow-up at 1001 today pt stated that she called yesterday and cancelled her appointment due to another medical appointment today. Will plan to continue therpay at her next scheduled appointment on 9/20/2021 at 1615.     Elgin Clayton, PT, DPT, OCS, MTC Blocked PIP flex/ext  MF but other fingers can move with it if he wants 2 sets x10  And all fingers together. PIP ext MF with place and hold ext - to do frequently during the day. Gentle this date. Blocked MP flex/ext  All fingers together. 2 sets x10. Also did each IF and MF alone and RF and SF together for  Pt to feel the tendon working. Gentle this date. Finger adduc/abduction x A to abduction RF - did with place and hold. IF alone. - Added yellow sponge to complete on either side of 3rd digit. x10   Wrist all planes  Stiff in wrist flexion - doing active only   LUE x Completed flexion/extension of PIP/DIP 3  sets x10 within pt's tolerance. Focus on extension/flexion of 3rd digit. Completed with A2 pulley splint donned- or therapist supporting this area. Hook/fist and target guiding  x10 to complete hook/fist using a pen with pt educated on proper positioning throughout. Added to HEP   -Red foam tube: placing half tube into palm and completing flexion of all digits to touch tube. Pt educated to pull down with DIP to assist with improving flexion and touching tube. Extensor tendon digit holds  x10 with palm down: therapist bringing 3rd digit up and pt was to hold for 3 seconds with fair tolerance, HEP within pain tolerance. AAROM:               PROM/Stretching:     L MF PIP ext/Flexion x Gentle completed to digits into slight extension with slight pain reported from palm incision. More tension to PIP ext   Finger flexion IF, RF and SF x R and SF together, IF alone. To do at home so he can loosen fingers up without the confines of the MF   MP flexion PROM with IP flex/ext X 3 sets of 5 rep's  Passively flex MP's while pt bends and straigthens his IP's. HEP   Towel scrunches x 5 mins added to HEP   L MF DIP flexion  Added to HEP also   Wrist flex. Ext -   Stiff end range.     Scar Mass/Edema Control: Retrograde massage/Scar tissue massage  On dorsal portion down digits, hand and wrist throughout session and in-between exercises. elastomere scar forms    To wear with night splint. IF scar at MP and MF in palm. Fabrciated a new elastomere form with an open area over the wound. Gel compression sheet  Med/lg, to wear and hr 1 -2 x's a day IF to decrease scar. Strengthenin# wt  x Wrist flex, wrist ext, RD - UD . Increased weight    Table top, bend at pip than extend digits while in table top and then full extend with slight resistance. x10 with fair tolerance and slight resistance provided for isometrics strengthening. Hand gripper  One yellow rubber hand, working on full composite fist and bringing the gripper in joint by joint, DIP, PIP and MCPs   Yellow therabar x 15x Pronation and supination, full composite fist    Red foam block x 2 sets x10 : composite fist, claw, middle and ring finger press, IF & F/thumb press. Other:     Metacarpal splint D/C  Re- molded splint for a better fit to be worn out and about and at night as needed. IF, MF ext splint  x Fabricated to wear at night with his elastomere gel pads to stretch scar tissue and PIP jt. . Made a new elastomere form leaving an open area for his wound to breathe. Pt can try wearing at night. To bring in next session to readjust to ^ ext pull. LMB X  For IF and MF. Wear 10 min to 30 for a total of 1 hr a day. A2 pulley splint  D/C Fabricated an A2 pulley splint with longer end for easy removal of splint after exercises completed. Pt may require adjustments due to edema on 3rd digit. Ext tube IF  D/c To wear occasionally during the day for an hour. HEP  X  Cont with, added MP flexion while flex/ext IP's - can use frap strap also, changed PIP ext from ext tube to LMB splints.          Ext tube splint MF D/C Wear occasionally during the day for an hour frap strap x Can be used to work full finger flexion and or hold MP's down with IP flex. ext- pt to play around with   Wound Care x May be closed but pt states he has occasional drainage. Splint adjustments  Completed for improved comfort and postioning due to decreasing edema. ^;ed space around  MP's     Assessment/Comments: Pt is making Fair progress toward stated plan of care and established goals. Strength measurements were assessed this date. Pt issued red foam block to start with light strengthening of the digits. Pt was able to complete activities without pain however when attempting to complete pushing into the block with only 3rd digit. Pt did tolerate increased wrist strengthening. Will continue as pt tolerates. R Upper Extremity ROM       AROM [x]? ?     PROM[]?? IE  10/22/2020 12/10/2020         WRIST Flexion 0-70* 23*  70*  75*       Extension 0-80* 34*  54*  60*       Radial Deviation 0-20* 18*  20*  20*       Ulnar Deviation 0-30* 20*  30*  30*        R Hand ROM    AROM [x]? ?         PROM[]?? IE   10/22/2020 12/10/2020       2nd Digit MCP Flexion/Extension */ 0-45 H TBA 46* /*23  70*/0*     PIP Flexion/Extension 100*/ 0 57*/- 29* 65*/34*  95*/-25*     DIP Flexion/Extension 70-90*/0 32*/- 23* 32* /0*  60*/0*        L MF     AROM [x]? ?         PROM[]?? 10/12/20 10/15/20   10/22/2020 12/10/2020        MF Digit MCP Extension/Flexion   0-45 H /* -30*/50* -20*/47*  14*/46*  0*/64*     PIP Extension/Flexion   0*/100* -55*/62* -50*/ 60* 51*/53*  -40*/ 82*     DIP Extension/Flexion   0*/70-90* Wiggle  P - 40*  wiggle   0*/25*  0*/55*      4th Digit MCP Flexion/Extension */ 0-45 H TBA  31*/0*    60*/0*     PIP Flexion/Extension 100*/ 0 70*/- 30* 72* /0*   95*/0*      DIP Flexion/Extension 70-90*/0 41*/0* 60* /0*   63*/0*          5th Digit MCP Flexion/Extension */ 0-45 H TBA 23*/ 0*   66* /0*     PIP Flexion/Extension 100*/ 0 74*.  +  /- 4* 80* /0*   95* /0*   DIP Flexion/Extension 70-90*/0 46*/0*  60*/0*   70* /0*         AROM   IF - PIP -34*   PROM:  PIP flexion ^'ed from -38* to -32* by end of session. End of session 10/15/20 - PROM -27*     Dynamometer (setting 2):                                    Left: 38#                                              Right: 107#                    Pinch Meter:              Lateral: Left=15# ,      Right=23#                Palmar 3 point: Left=9# ,      Right=20#       -Rehab Potential: Good  -Requires OT Follow Up: Yes  Time In: 3:00 pm     Time Out: 4:00 pm            Visit #: 6 ( 18 session used already)    Treatment Charges: Mins Time in - Time out  Units   Modalities- fluiothe 15 3:00 pm - 3:15  pm 1   Ther Exercise 30 3:15 pm - 3:45 pm 2   Manual Therapy 15 3:45 pm - 4:00 pm 1   Thera Activities      ADL/Home Mgt       Neuro Re-education      Gait Training      Group Therapy      Non-Billable Service Time MHP      Other splint lidia      Total Time/Units 60  4     -Response to Treatment: Pt  Belia Mohr to get his finger moving better into a fist.   Goals: Goals for pt can be see on initial eval occurring on 09/08/2020 and on 10/22/2020, 12/10/2020    Plan:   [x]  Continues Plan of care: 3x/week for 6 weeks until 01/18/2021. Treatment covered based on POC and graduated to patient's progress. Pt education continues at each visit to obtain maximum benefits from skilled OT intervention.   []  Alter Plan of care:   []  Discharge:      Rosas Thompson, OTR/L #[id number]

## 2021-09-20 ENCOUNTER — HOSPITAL ENCOUNTER (OUTPATIENT)
Dept: PHYSICAL THERAPY | Age: 41
Discharge: HOME | End: 2021-09-20
Payer: COMMERCIAL

## 2021-09-20 NOTE — PROGRESS NOTES
Pt cancelled today's visit - is discontinuing physical therapy in lieu of alternative treatment.    Sierra Kraus, PT, DPT, OCS, MTC

## 2021-09-22 ENCOUNTER — APPOINTMENT (OUTPATIENT)
Dept: PHYSICAL THERAPY | Age: 41
End: 2021-09-22
Payer: COMMERCIAL

## 2021-09-22 NOTE — THERAPY DISCHARGE
Biju Cardenas  : 1980 [address] [facility]  [address] [place] [address].  [phone]   [phone]        OUTPATIENT PHYSICAL THERAPY:Discontinuation 2021         ICD-10: Treatment Diagnosis: Pain in right arm (M79.601), Pain in left arm (M79.602), Stiffness of right shoulder, not elsewhere classified (M25.611), Stiffness of left shoulder, not elsewhere classified (M25.612), Pain in right upper arm (M79.621) and Pain in left upper arm (B76.122)  Precautions/Allergies:   Patient has no known allergies. Fall Risk Score:     Ambulatory/Rehab Services H2 Model Falls Risk Assessment    Risk Factors:       No Risk Factors Identified Ability to Rise from Chair:       (0)  Ability to rise in a single movement    Falls Prevention Plan:       No modifications necessary   Total: (5 or greater = High Risk): 0     [facility] of [employer]. All Rights Reserved. [facility] [employer] Patent #4,[id number]. Federal Law prohibits the replication, distribution or use without written permission from [facility] [employer]     MD Orders: Evaluate and treat: L and R shoulder impingement MEDICAL/REFERRING DIAGNOSIS:  Impingement syndrome of right shoulder [M75.41]  Impingement syndrome of left shoulder [M75.42]   DATE OF ONSET:   REFERRING PHYSICIAN: Christoph Wei DO  RETURN PHYSICIAN APPOINTMENT: TBD     INITIAL ASSESSMENT:  Ms. Adelaide Cruz presents with bilateral shoulder pain and stiffness that hinders her ability to reach overhead and behind back with either arm to complete ADL's and personal care activities. She requires skilled physical therapy intervention to restore normal shoulder function and to teach self care strategies to prevent re-injury. PROBLEM LIST (Impacting functional limitations):  1. Decreased Strength  2. Increased Pain  3. Decreased Activity Tolerance  4. Decreased Flexibility/Joint Mobility  5.  Decreased Dane with Home Exercise Program INTERVENTIONS PLANNED:  1. Electrical Stimulation  2. Home Exercise Program (HEP)  3. Range of Motion (ROM)  4. Therapeutic Activites  5. Therapeutic Exercise/Strengthening    TREATMENT PLAN:  Effective Dates: 8/25/2021 TO 10/24/2021 (60 days). Frequency/Duration: 3 times a week for 60 Days  GOALS: (Goals have been discussed and agreed upon with patient.)  Short-Term Functional Goals: Time Frame: 3 weeks  1. Independent performance of home exercise program (MET)  2. Ability to reach behind back with either arm to mid thorax without pain for personal care/dressing/washing back (PROGRESSING)  Discharge Goals: Time Frame: 7 weeks  1. Pt to demonstrate ability to reach/lift overhead with either arm with 10# for reaching to tall shelves (not met)  2. Pt to demonstrate sleep on either side without pain interruption. (not met)  3. Pt to demonstrate Quick-DASH score < 13 to reflect return to normal function (not met)  Rehabilitation Potential For Stated Goals: Good  Regarding Adriane Anne's therapy, I certify that the treatment plan above will be carried out by a therapist or under their direction. Thank you for this referral,  Tabitha Florez, PT       Referring Physician Signature: Sarah Solomon DO              Date                      HISTORY:   History of Present Injury/Illness (Reason for Referral):  Pre-treatment Symptoms/Complaints: (9/13/2021): Right shoulder is better, but left continues to be sore/stiff. Taking antiinflammatory, also to get laser treatments on left shoulder. 9/20/2021: Pt called to cancel all remaining appointments - to try alternative therapy *(laser treatments). Pain: Initial: 4/10 Post Session:  No increase/10       Past Medical History/Comorbidities:   Ms. Rosi Pike  has a past medical history of Ocular migraine. Ms. Rosi Pike  has a past surgical history that includes pr breast surgery procedure unlisted.   Social History/Living Environment:     Pt , one child at home  Prior Level of Function/Work/Activity:  Normal shoulder function. Runs for fitness, Her work is sedentary (desk job). Dominant Side:         RIGHT  Current Medications:    Current Outpatient Medications:     crisaborole (EUCRISA) 2 % oint, by Apply Externally route as needed. , Disp: , Rfl:     triamcinolone acetonide (KENALOG) 0.1 % topical cream, Apply  to affected area two (2) times a day. use thin layer (Patient taking differently: Apply  to affected area two (2) times daily as needed. use thin layer), Disp: 45 g, Rfl: 1    Cetirizine (ZYRTEC) 10 mg cap, Take 10 mg by mouth daily as needed. , Disp: , Rfl:    Date Last Reviewed:  9/20/2021   # of Personal Factors/Comorbidities that affect the Plan of Care: 1-2: MODERATE COMPLEXITY   EXAMINATION:   Observation/Orthostatic Postural Assessment:    Bilateral scapular protraction posture. Palpation:    Humeral head tenderness bilaterally  ROM:  Updated Objective Findings:  Shoulder IR w/ sleeper stretch (against horizontal: left = 40 deg, right = 30 deg. Supine angel luis. ER (abducted): left = 60 deg, right = 95 deg. Left shoulder: flexion = 148 deg active (pain), 142 deg passive (pain), abduction = 156 deg active, External rotaiton = 67 deg active (arm at side0, 50 deg passive (arm abducted, pain at end range). IR = 68 deg. Apley reach tests: T1 ER, T6 IR w/ pain. Right shoulder: flexion = 155 deg active (pain), 160 deg passive (pain), abduction = 167 deg active, ER = 78 deg active, 68 deg passive (pain). Apley reach tests: T1/T9 (pain limited). Strength:   Hand held dynamometer: flexion = 18.5# left, 17.9# right, External rotation: 9.7# left, 12. 5# right, Internal rotation: 10.5# left, 16.1 # right. Special Tests:    + left and right: Neer's and Copeland Aristeo impingement test. Neg. Sulcus and load-shift tests. Neurological Screen:  Grossly intact light touch sensation bilat.  UE's  Functional Mobility:   Normal performance of supine-sit-stand transfers and walking gait. Balance:    Normal static standing balance   Body Structures Involved:  1. Nerves  2. Bones  3. Joints  4. Muscles  5. Ligaments Body Functions Affected:  1. Sensory/Pain  2. Neuromusculoskeletal  3. Movement Related Activities and Participation Affected:  1. General Tasks and Demands  2. Mobility  3. Self Care  4. Domestic Life  5. Interpersonal Interactions and Relationships  6. Community, Social and [place]   # of elements that affect the Plan of Care: 3: MODERATE COMPLEXITY   CLINICAL PRESENTATION:   Presentation: Evolving clinical presentation with changing clinical characteristics: MODERATE COMPLEXITY   CLINICAL DECISION MAKING:   Tool Used: Disabilities of the Arm, Shoulder and Hand (DASH) Questionnaire - Quick Version  Score:  Initial: 25/55  Most Recent: X/55 (Date: -- )   Interpretation of Score: The DASH is designed to measure the activities of daily living in person's with upper extremity dysfunction or pain. Each section is scored on a 1-5 scale, 5 representing the greatest disability. The scores of each section are added together for a total score of 55. Medical Necessity:   · Patient is expected to demonstrate progress in strength, range of motion, balance and coordination  ·  to allow return of normal overhead lfting and normal overhead and behind back reaching for ADL's and personal care. ·   Reason for Services/Other Comments:  · Patient has demonstrated an improvement in functional level by independent performance of HEP. · .Pt is electing early discharge to try alternative therapy apporoach (laser therapy) with a different provider.     Use of outcome tool(s) and clinical judgement create a POC that gives a: Questionable prediction of patient's progress: [address], PT

## 2021-09-24 ENCOUNTER — APPOINTMENT (OUTPATIENT)
Dept: PHYSICAL THERAPY | Age: 41
End: 2021-09-24
Payer: COMMERCIAL

## 2021-09-27 ENCOUNTER — APPOINTMENT (OUTPATIENT)
Dept: PHYSICAL THERAPY | Age: 41
End: 2021-09-27
Payer: COMMERCIAL

## 2021-09-29 ENCOUNTER — APPOINTMENT (OUTPATIENT)
Dept: PHYSICAL THERAPY | Age: 41
End: 2021-09-29
Payer: COMMERCIAL

## 2021-10-01 ENCOUNTER — APPOINTMENT (OUTPATIENT)
Dept: PHYSICAL THERAPY | Age: 41
End: 2021-10-01

## 2021-10-04 ENCOUNTER — APPOINTMENT (OUTPATIENT)
Dept: PHYSICAL THERAPY | Age: 41
End: 2021-10-04

## 2021-10-06 ENCOUNTER — APPOINTMENT (OUTPATIENT)
Dept: PHYSICAL THERAPY | Age: 41
End: 2021-10-06

## 2021-10-08 ENCOUNTER — APPOINTMENT (OUTPATIENT)
Dept: PHYSICAL THERAPY | Age: 41
End: 2021-10-08

## 2021-10-11 ENCOUNTER — APPOINTMENT (OUTPATIENT)
Dept: PHYSICAL THERAPY | Age: 41
End: 2021-10-11

## 2021-10-13 ENCOUNTER — APPOINTMENT (OUTPATIENT)
Dept: PHYSICAL THERAPY | Age: 41
End: 2021-10-13

## 2021-10-15 ENCOUNTER — APPOINTMENT (OUTPATIENT)
Dept: PHYSICAL THERAPY | Age: 41
End: 2021-10-15

## 2021-10-18 ENCOUNTER — APPOINTMENT (OUTPATIENT)
Dept: PHYSICAL THERAPY | Age: 41
End: 2021-10-18

## 2021-10-20 ENCOUNTER — APPOINTMENT (OUTPATIENT)
Dept: PHYSICAL THERAPY | Age: 41
End: 2021-10-20

## 2021-10-22 ENCOUNTER — APPOINTMENT (OUTPATIENT)
Dept: PHYSICAL THERAPY | Age: 41
End: 2021-10-22

## 2021-10-25 ENCOUNTER — APPOINTMENT (OUTPATIENT)
Dept: PHYSICAL THERAPY | Age: 41
End: 2021-10-25

## 2021-10-27 ENCOUNTER — APPOINTMENT (OUTPATIENT)
Dept: PHYSICAL THERAPY | Age: 41
End: 2021-10-27

## 2021-10-29 ENCOUNTER — APPOINTMENT (OUTPATIENT)
Dept: PHYSICAL THERAPY | Age: 41
End: 2021-10-29

## 2021-12-30 LAB — MAMMOGRAPHY, EXTERNAL: NORMAL

## 2023-09-26 ENCOUNTER — OFFICE VISIT (OUTPATIENT)
Dept: FAMILY MEDICINE CLINIC | Facility: CLINIC | Age: 43
End: 2023-09-26
Payer: COMMERCIAL

## 2023-09-26 VITALS
OXYGEN SATURATION: 98 % | WEIGHT: 137.6 LBS | HEIGHT: 65 IN | RESPIRATION RATE: 16 BRPM | SYSTOLIC BLOOD PRESSURE: 116 MMHG | HEART RATE: 78 BPM | DIASTOLIC BLOOD PRESSURE: 64 MMHG | BODY MASS INDEX: 22.92 KG/M2

## 2023-09-26 DIAGNOSIS — J30.1 SEASONAL ALLERGIC RHINITIS DUE TO POLLEN: Primary | ICD-10-CM

## 2023-09-26 DIAGNOSIS — R19.7 ACUTE DIARRHEA: ICD-10-CM

## 2023-09-26 DIAGNOSIS — S01.01XD LACERATION OF SCALP, SUBSEQUENT ENCOUNTER: ICD-10-CM

## 2023-09-26 DIAGNOSIS — K92.1 HEMATOCHEZIA: ICD-10-CM

## 2023-09-26 PROCEDURE — 99213 OFFICE O/P EST LOW 20 MIN: CPT | Performed by: FAMILY MEDICINE

## 2023-09-26 RX ORDER — PREDNISONE 20 MG/1
20 TABLET ORAL DAILY
COMMUNITY
Start: 2023-09-24 | End: 2023-09-26 | Stop reason: ALTCHOICE

## 2023-09-26 RX ORDER — AZELASTINE 1 MG/ML
2 SPRAY, METERED NASAL
Qty: 30 ML | Refills: 4 | Status: SHIPPED | OUTPATIENT
Start: 2023-09-26

## 2023-09-26 RX ORDER — AZITHROMYCIN 250 MG/1
TABLET, FILM COATED ORAL
COMMUNITY
Start: 2023-09-24 | End: 2023-09-26 | Stop reason: ALTCHOICE

## 2023-09-26 RX ORDER — FLUTICASONE PROPIONATE 50 MCG
2 SPRAY, SUSPENSION (ML) NASAL DAILY
Qty: 1 EACH | Refills: 5 | Status: SHIPPED | OUTPATIENT
Start: 2023-09-26

## 2023-09-26 SDOH — ECONOMIC STABILITY: HOUSING INSECURITY
IN THE LAST 12 MONTHS, WAS THERE A TIME WHEN YOU DID NOT HAVE A STEADY PLACE TO SLEEP OR SLEPT IN A SHELTER (INCLUDING NOW)?: NO

## 2023-09-26 SDOH — ECONOMIC STABILITY: FOOD INSECURITY: WITHIN THE PAST 12 MONTHS, YOU WORRIED THAT YOUR FOOD WOULD RUN OUT BEFORE YOU GOT MONEY TO BUY MORE.: NEVER TRUE

## 2023-09-26 SDOH — ECONOMIC STABILITY: INCOME INSECURITY: HOW HARD IS IT FOR YOU TO PAY FOR THE VERY BASICS LIKE FOOD, HOUSING, MEDICAL CARE, AND HEATING?: NOT HARD AT ALL

## 2023-09-26 SDOH — ECONOMIC STABILITY: FOOD INSECURITY: WITHIN THE PAST 12 MONTHS, THE FOOD YOU BOUGHT JUST DIDN'T LAST AND YOU DIDN'T HAVE MONEY TO GET MORE.: NEVER TRUE

## 2023-09-26 ASSESSMENT — PATIENT HEALTH QUESTIONNAIRE - PHQ9
SUM OF ALL RESPONSES TO PHQ QUESTIONS 1-9: 0
SUM OF ALL RESPONSES TO PHQ QUESTIONS 1-9: 0
2. FEELING DOWN, DEPRESSED OR HOPELESS: 0
1. LITTLE INTEREST OR PLEASURE IN DOING THINGS: 0
SUM OF ALL RESPONSES TO PHQ QUESTIONS 1-9: 0
SUM OF ALL RESPONSES TO PHQ9 QUESTIONS 1 & 2: 0
SUM OF ALL RESPONSES TO PHQ QUESTIONS 1-9: 0

## 2023-09-26 ASSESSMENT — ENCOUNTER SYMPTOMS
VOMITING: 0
DIARRHEA: 1
NAUSEA: 1
SHORTNESS OF BREATH: 0
COUGH: 0
BLOOD IN STOOL: 1
ABDOMINAL PAIN: 1

## 2023-09-26 NOTE — PROGRESS NOTES
[address] Farshad Graham, DO  [address], [address] [facility],[address]   No:  [phone]  Fax:  [phone]        Assessment/Plan:   Viviana Rose was seen today for ed follow-up and stool color change. Diagnoses and all orders for this visit:    Seasonal allergic rhinitis due to pollen  Stop Afrin, stop prednisone and azithromycin. I see no evidence of allergies on exam.  Start nasal steroid in the form of Flonase and start Astelin nasal spray. Resume the counter cetirizine.  -     fluticasone (FLONASE) 50 MCG/ACT nasal spray; 2 sprays by Each Nostril route daily  -     azelastine (ASTELIN) 0.1 % nasal spray; 2 sprays by Nasal route nightly Use in each nostril as directed    Acute diarrhea  Secondary to recent antibiotic use. With symptoms improving I am not going to assess for C. difficile at this time. Stop azithromycin, stop prednisone, continue probiotic. Hematochezia  Limited episodes after a week of diarrhea. Possible mild case of C. difficile though more likely internal hemorrhoid from the diarrhea. As symptoms have improved I am not can proceed with further work-up in this a continue at which point she may need referral to GI. Continue with probiotic. Laceration of scalp, subsequent encounter  Return to clinic late next week for removal of staples. Reviewed ER consult note from 9/24/2023. This occurred at home while moving basketball court on 9/24/2023                Jana Herbert is a 43 y.o. female who is seen for evaluation of   Chief Complaint   Patient presents with    ED Follow-up     Danisha Beckwith. Head laceration. Stool Color Change     Taking Zpac and Steriods for sinus congestion. Having blood stools and hot flashes, started on Sunday 9/24. Stared probiotic and stool is no longer bloody          HPI:   She started taking amoxicillin September 14 she took this for 5 days and completed a round September 18.   From the 25 to

## 2023-10-02 ENCOUNTER — OFFICE VISIT (OUTPATIENT)
Dept: FAMILY MEDICINE CLINIC | Facility: CLINIC | Age: 43
End: 2023-10-02
Payer: COMMERCIAL

## 2023-10-02 VITALS
HEIGHT: 65 IN | DIASTOLIC BLOOD PRESSURE: 80 MMHG | OXYGEN SATURATION: 99 % | WEIGHT: 138 LBS | BODY MASS INDEX: 22.99 KG/M2 | SYSTOLIC BLOOD PRESSURE: 120 MMHG | HEART RATE: 80 BPM | TEMPERATURE: 98 F

## 2023-10-02 DIAGNOSIS — J30.1 SEASONAL ALLERGIC RHINITIS DUE TO POLLEN: ICD-10-CM

## 2023-10-02 DIAGNOSIS — S01.01XD LACERATION OF SCALP, SUBSEQUENT ENCOUNTER: Primary | ICD-10-CM

## 2023-10-02 PROCEDURE — 99213 OFFICE O/P EST LOW 20 MIN: CPT | Performed by: FAMILY MEDICINE

## 2023-10-02 ASSESSMENT — ENCOUNTER SYMPTOMS: RHINORRHEA: 1

## 2023-10-02 NOTE — PROGRESS NOTES
[address] Farshad Graham, DO  [address], [address] [facility],[address]  Ph No:  [phone]  Fax:  [phone]        Assessment/Plan:   Juan Linares was seen today for suture / staple removal.    Diagnoses and all orders for this visit:    Laceration of scalp, subsequent encounter  Staples removed in the office without any difficulty. There is no concerning findings of infection at this time. Advised gentle shampoo and conditioner per usual.    Seasonal allergic rhinitis due to pollen  Improved. Continue current regimen of Astelin nasal spray, Flonase and cetirizine                Olesya Maloney is a 37 y.o. female who is seen for evaluation of   Chief Complaint   Patient presents with    Suture / Staple Removal       HPI: Here today for stable removal right parietal scalp after injury. She was seen last week for this and acute allergy symptoms. She reports that allergy symptoms have improved remarkably. The nasal drainage is clear and much less. She has not had any headaches still from the laceration. Her  been applying Neosporin. GI and symptoms of diarrhea have also improved    Review of Systems:  Review of Systems   Constitutional:  Negative for chills and fever. HENT:  Positive for rhinorrhea. Allergic/Immunologic: Positive for environmental allergies. History:  Past Medical History:   Diagnosis Date    Ocular migraine        Past Surgical History:   Procedure Laterality Date    BREAST SURGERY      augmentation       Current Outpatient Medications   Medication Sig Dispense Refill    fluticasone (FLONASE) 50 MCG/ACT nasal spray 2 sprays by Each Nostril route daily 1 each 5    azelastine (ASTELIN) 0.1 % nasal spray 2 sprays by Nasal route nightly Use in each nostril as directed 30 mL 4    Cetirizine HCl 10 MG CAPS Take 10 mg by mouth daily as needed       No current facility-administered medications for this visit.        Immunization History

## 2024-01-05 LAB — MAMMOGRAPHY, EXTERNAL: NORMAL

## 2024-01-26 ENCOUNTER — OFFICE VISIT (OUTPATIENT)
Dept: FAMILY MEDICINE CLINIC | Facility: CLINIC | Age: 44
End: 2024-01-26
Payer: COMMERCIAL

## 2024-01-26 VITALS
WEIGHT: 137.6 LBS | RESPIRATION RATE: 16 BRPM | SYSTOLIC BLOOD PRESSURE: 114 MMHG | HEART RATE: 57 BPM | OXYGEN SATURATION: 98 % | HEIGHT: 65 IN | DIASTOLIC BLOOD PRESSURE: 62 MMHG | BODY MASS INDEX: 22.92 KG/M2

## 2024-01-26 DIAGNOSIS — Z11.4 SCREENING FOR HIV WITHOUT PRESENCE OF RISK FACTORS: ICD-10-CM

## 2024-01-26 DIAGNOSIS — Z11.59 NEED FOR HEPATITIS C SCREENING TEST: ICD-10-CM

## 2024-01-26 DIAGNOSIS — K92.1 HEMATOCHEZIA: Primary | ICD-10-CM

## 2024-01-26 DIAGNOSIS — K52.9 CHRONIC DIARRHEA: ICD-10-CM

## 2024-01-26 DIAGNOSIS — L29.0 RECTAL ITCHING: ICD-10-CM

## 2024-01-26 LAB
ALBUMIN SERPL-MCNC: 3.9 G/DL (ref 3.5–5)
ALBUMIN/GLOB SERPL: 1.4 (ref 0.4–1.6)
ALP SERPL-CCNC: 56 U/L (ref 50–136)
ALT SERPL-CCNC: 23 U/L (ref 12–65)
ANION GAP SERPL CALC-SCNC: 4 MMOL/L (ref 2–11)
AST SERPL-CCNC: 12 U/L (ref 15–37)
BASOPHILS # BLD: 0 K/UL (ref 0–0.2)
BASOPHILS NFR BLD: 1 % (ref 0–2)
BILIRUB SERPL-MCNC: 0.2 MG/DL (ref 0.2–1.1)
BUN SERPL-MCNC: 11 MG/DL (ref 6–23)
CALCIUM SERPL-MCNC: 8.8 MG/DL (ref 8.3–10.4)
CHLORIDE SERPL-SCNC: 106 MMOL/L (ref 103–113)
CO2 SERPL-SCNC: 29 MMOL/L (ref 21–32)
CREAT SERPL-MCNC: 0.9 MG/DL (ref 0.6–1)
DIFFERENTIAL METHOD BLD: NORMAL
EOSINOPHIL # BLD: 0.1 K/UL (ref 0–0.8)
EOSINOPHIL NFR BLD: 1 % (ref 0.5–7.8)
ERYTHROCYTE [DISTWIDTH] IN BLOOD BY AUTOMATED COUNT: 13 % (ref 11.9–14.6)
GLOBULIN SER CALC-MCNC: 2.8 G/DL (ref 2.8–4.5)
GLUCOSE SERPL-MCNC: 88 MG/DL (ref 65–100)
HCT VFR BLD AUTO: 38.5 % (ref 35.8–46.3)
HCV AB SER QL: NONREACTIVE
HGB BLD-MCNC: 12.4 G/DL (ref 11.7–15.4)
HIV 1+2 AB+HIV1 P24 AG SERPL QL IA: NONREACTIVE
HIV 1/2 RESULT COMMENT: NORMAL
IMM GRANULOCYTES # BLD AUTO: 0 K/UL (ref 0–0.5)
IMM GRANULOCYTES NFR BLD AUTO: 0 % (ref 0–5)
LYMPHOCYTES # BLD: 2 K/UL (ref 0.5–4.6)
LYMPHOCYTES NFR BLD: 29 % (ref 13–44)
MCH RBC QN AUTO: 28.4 PG (ref 26.1–32.9)
MCHC RBC AUTO-ENTMCNC: 32.2 G/DL (ref 31.4–35)
MCV RBC AUTO: 88.3 FL (ref 82–102)
MONOCYTES # BLD: 0.5 K/UL (ref 0.1–1.3)
MONOCYTES NFR BLD: 7 % (ref 4–12)
NEUTS SEG # BLD: 4.3 K/UL (ref 1.7–8.2)
NEUTS SEG NFR BLD: 62 % (ref 43–78)
NRBC # BLD: 0 K/UL (ref 0–0.2)
PLATELET # BLD AUTO: 286 K/UL (ref 150–450)
PMV BLD AUTO: 11.8 FL (ref 9.4–12.3)
POTASSIUM SERPL-SCNC: 3.9 MMOL/L (ref 3.5–5.1)
PROT SERPL-MCNC: 6.7 G/DL (ref 6.3–8.2)
RBC # BLD AUTO: 4.36 M/UL (ref 4.05–5.2)
SODIUM SERPL-SCNC: 139 MMOL/L (ref 136–146)
T4 FREE SERPL-MCNC: 1 NG/DL (ref 0.78–1.46)
TSH, 3RD GENERATION: 0.8 UIU/ML (ref 0.36–3.74)
WBC # BLD AUTO: 6.9 K/UL (ref 4.3–11.1)

## 2024-01-26 PROCEDURE — 99214 OFFICE O/P EST MOD 30 MIN: CPT | Performed by: FAMILY MEDICINE

## 2024-01-26 RX ORDER — TRIAMCINOLONE ACETONIDE 1 MG/G
OINTMENT TOPICAL 2 TIMES DAILY
Qty: 30 G | Refills: 0 | Status: SHIPPED | OUTPATIENT
Start: 2024-01-26 | End: 2024-01-29

## 2024-01-26 ASSESSMENT — ENCOUNTER SYMPTOMS
BLOOD IN STOOL: 1
COUGH: 0
DIARRHEA: 1
NAUSEA: 0
SHORTNESS OF BREATH: 0
VOMITING: 0
ABDOMINAL PAIN: 0

## 2024-01-26 ASSESSMENT — PATIENT HEALTH QUESTIONNAIRE - PHQ9
1. LITTLE INTEREST OR PLEASURE IN DOING THINGS: 0
SUM OF ALL RESPONSES TO PHQ QUESTIONS 1-9: 0
SUM OF ALL RESPONSES TO PHQ9 QUESTIONS 1 & 2: 0
SUM OF ALL RESPONSES TO PHQ QUESTIONS 1-9: 0
2. FEELING DOWN, DEPRESSED OR HOPELESS: 0

## 2024-01-26 NOTE — PROGRESS NOTES
fluticasone (FLONASE) 50 MCG/ACT nasal spray 2 sprays by Each Nostril route daily 1 each 5    azelastine (ASTELIN) 0.1 % nasal spray 2 sprays by Nasal route nightly Use in each nostril as directed 30 mL 4    Cetirizine HCl 10 MG CAPS Take 10 mg by mouth daily as needed       No current facility-administered medications for this visit.       Immunization History   Administered Date(s) Administered    Influenza Trivalent 10/04/2016    Influenza Virus Vaccine 09/16/2015, 09/22/2018, 09/26/2019    Influenza, FLUARIX, FLULAVAL, FLUZONE (age 6 mo+) AND AFLURIA, (age 3 y+), PF, 0.5mL 09/22/2018, 09/26/2019    TDaP, ADACEL (age 10y-64y), BOOSTRIX (age 10y+), IM, 0.5mL 09/24/2023       PHQ-9: Over the past 2 weeks, how often have you been bothered by any of the following problems?  Little interest or pleasure in doing things: Not at all  Feeling down, depressed, or hopeless: Not at all  PHQ-9 Total Score: 0     Vitals:    Vitals:    01/26/24 1119   BP: 114/62   Site: Left Upper Arm   Position: Sitting   Pulse: 57   Resp: 16   SpO2: 98%   Weight: 62.4 kg (137 lb 9.6 oz)   Height: 1.651 m (5' 5\")          Physical Exam:  Physical Exam  Vitals reviewed. Exam conducted with a chaperone present.   Constitutional:       Appearance: Normal appearance.   HENT:      Head: Normocephalic and atraumatic.   Cardiovascular:      Rate and Rhythm: Normal rate and regular rhythm.      Heart sounds: No murmur heard.  Pulmonary:      Effort: Pulmonary effort is normal. No respiratory distress.      Breath sounds: Normal breath sounds. No wheezing or rhonchi.   Abdominal:      General: There is no distension.      Palpations: There is no mass.      Tenderness: There is no abdominal tenderness. There is no right CVA tenderness, left CVA tenderness, guarding or rebound.      Hernia: No hernia is present.   Genitourinary:     Rectum: No external hemorrhoid.      Comments: No concerning findings on external rectal exam.  No rash.  Musculoskeletal:

## 2024-01-29 DIAGNOSIS — K52.9 CHRONIC DIARRHEA: ICD-10-CM

## 2024-01-31 LAB
G LAMBLIA AG STL QL IA: NEGATIVE
SPECIMEN SOURCE: NORMAL

## 2024-02-01 LAB
BACTERIA SPEC CULT: NORMAL
CALPROTECTIN STL-MCNT: 10 UG/G (ref 0–120)
SERVICE CMNT-IMP: NORMAL

## 2024-02-02 LAB
O+P SPEC MICRO: NORMAL
O+P STL CONC: NORMAL
SPECIMEN SOURCE: NORMAL

## 2024-11-19 ENCOUNTER — OFFICE VISIT (OUTPATIENT)
Dept: FAMILY MEDICINE CLINIC | Facility: CLINIC | Age: 44
End: 2024-11-19
Payer: COMMERCIAL

## 2024-11-19 VITALS
HEART RATE: 51 BPM | BODY MASS INDEX: 23.13 KG/M2 | HEIGHT: 65 IN | OXYGEN SATURATION: 100 % | SYSTOLIC BLOOD PRESSURE: 110 MMHG | DIASTOLIC BLOOD PRESSURE: 64 MMHG | RESPIRATION RATE: 16 BRPM | WEIGHT: 138.8 LBS

## 2024-11-19 DIAGNOSIS — Z12.39 BREAST CANCER SCREENING, HIGH RISK PATIENT: ICD-10-CM

## 2024-11-19 DIAGNOSIS — Z12.4 CERVICAL CANCER SCREENING: ICD-10-CM

## 2024-11-19 DIAGNOSIS — Z00.00 WELL ADULT EXAM: Primary | ICD-10-CM

## 2024-11-19 DIAGNOSIS — Z11.51 ENCOUNTER FOR SCREENING FOR HUMAN PAPILLOMAVIRUS (HPV): ICD-10-CM

## 2024-11-19 DIAGNOSIS — Z12.31 ENCOUNTER FOR SCREENING MAMMOGRAM FOR BREAST CANCER: ICD-10-CM

## 2024-11-19 DIAGNOSIS — Z00.00 WELL ADULT EXAM: ICD-10-CM

## 2024-11-19 PROCEDURE — 99396 PREV VISIT EST AGE 40-64: CPT | Performed by: FAMILY MEDICINE

## 2024-11-19 SDOH — ECONOMIC STABILITY: FOOD INSECURITY: WITHIN THE PAST 12 MONTHS, THE FOOD YOU BOUGHT JUST DIDN'T LAST AND YOU DIDN'T HAVE MONEY TO GET MORE.: NEVER TRUE

## 2024-11-19 SDOH — ECONOMIC STABILITY: FOOD INSECURITY: WITHIN THE PAST 12 MONTHS, YOU WORRIED THAT YOUR FOOD WOULD RUN OUT BEFORE YOU GOT MONEY TO BUY MORE.: NEVER TRUE

## 2024-11-19 SDOH — ECONOMIC STABILITY: INCOME INSECURITY: HOW HARD IS IT FOR YOU TO PAY FOR THE VERY BASICS LIKE FOOD, HOUSING, MEDICAL CARE, AND HEATING?: NOT HARD AT ALL

## 2024-11-19 ASSESSMENT — PATIENT HEALTH QUESTIONNAIRE - PHQ9
SUM OF ALL RESPONSES TO PHQ QUESTIONS 1-9: 0
1. LITTLE INTEREST OR PLEASURE IN DOING THINGS: NOT AT ALL
SUM OF ALL RESPONSES TO PHQ QUESTIONS 1-9: 0
2. FEELING DOWN, DEPRESSED OR HOPELESS: NOT AT ALL
SUM OF ALL RESPONSES TO PHQ QUESTIONS 1-9: 0
SUM OF ALL RESPONSES TO PHQ9 QUESTIONS 1 & 2: 0
SUM OF ALL RESPONSES TO PHQ QUESTIONS 1-9: 0

## 2024-11-19 ASSESSMENT — ENCOUNTER SYMPTOMS
BLOOD IN STOOL: 0
SHORTNESS OF BREATH: 0
VOMITING: 0
ABDOMINAL PAIN: 0
NAUSEA: 0
COUGH: 0

## 2024-11-19 NOTE — PROGRESS NOTES
[facility]  Josefa Sanchez, DO  [address] [facility] [place] [address]  Ph No:  [phone]  Fax:  [phone]        Assessment/Plan:   Olesya was seen today for gynecologic exam.    Diagnoses and all orders for this visit:    Well adult exam  Anticipatory guidance given.  We did briefly discuss perimenopause and some lifestyle things that can help and treatment options if needed in the future.  -     PAP IG, Aptima HPV and rfx 16/18,45 ([id number]); Future    Cervical cancer screening  -     PAP IG, Aptima HPV and rfx 16/18,45 ([id number]); Future    Encounter for screening for human papillomavirus (HPV)  -     PAP IG, Aptima HPV and rfx 16/18,45 ([id number]); Future    Encounter for screening mammogram for breast cancer  She will be due for mammogram in January.  -     RAFIA JEFFERY DIGITAL SCREEN BILATERAL [[id number]]; Future    Breast cancer screening, high risk patient  Ordering breast MRI.  -     MRI BREAST BILATERAL W WO CONTRAST; Future    She is going to look into medication deductible in regards to timing of the breast MRI and mammogram.  Discussed that it may be beneficial to try to do 1 or or the other every 6 months.  She does have denser breast tissue therefore high risk for breast cancer development.    Labs:  No results found for this visit on 11/19/24.            Olesya Gold is a 44 y.o. female who is seen for evaluation of   Chief Complaint   Patient presents with    Gynecologic Exam     Well Woman with PAP       HPI:   Here today for well woman exam.  Menstrual cycles are light but regular.  Not concern for STIs.  Noting a little bit of irritability, but no night sweats or regular menstrual cycles.  Has not scheduled a breast MRI due to dense breast tissue and higher risk for breast cancer.  Was postponing breast MRI because she had to after having polyps removed and staples placed.  She was told to wait 3 months before proceeding with the MRI.  Does not eat a lot of  9

## 2024-11-26 LAB
COLLECTION METHOD: NORMAL
CYTOLOGIST CVX/VAG CYTO: NORMAL
CYTOLOGY CVX/VAG DOC THIN PREP: NORMAL
DATE OF LMP: NORMAL
HPV APTIMA: NEGATIVE
HPV GENOTYPE REFLEX: NORMAL
Lab: NORMAL
PAP SOURCE: NORMAL
PATH REPORT.FINAL DX SPEC: NORMAL
STAT OF ADQ CVX/VAG CYTO-IMP: NORMAL

## 2025-01-13 DIAGNOSIS — R92.8 ABNORMAL MAMMOGRAM OF RIGHT BREAST: Primary | ICD-10-CM

## 2025-01-13 DIAGNOSIS — Z12.31 ENCOUNTER FOR SCREENING MAMMOGRAM FOR BREAST CANCER: ICD-10-CM

## 2025-01-21 DIAGNOSIS — R92.8 ABNORMAL MAMMOGRAM OF RIGHT BREAST: ICD-10-CM

## 2025-07-28 SDOH — ECONOMIC STABILITY: FOOD INSECURITY: WITHIN THE PAST 12 MONTHS, YOU WORRIED THAT YOUR FOOD WOULD RUN OUT BEFORE YOU GOT MONEY TO BUY MORE.: NEVER TRUE

## 2025-07-28 SDOH — ECONOMIC STABILITY: TRANSPORTATION INSECURITY
IN THE PAST 12 MONTHS, HAS LACK OF TRANSPORTATION KEPT YOU FROM MEETINGS, WORK, OR FROM GETTING THINGS NEEDED FOR DAILY LIVING?: NO

## 2025-07-28 SDOH — ECONOMIC STABILITY: INCOME INSECURITY: IN THE LAST 12 MONTHS, WAS THERE A TIME WHEN YOU WERE NOT ABLE TO PAY THE MORTGAGE OR RENT ON TIME?: NO

## 2025-07-28 SDOH — ECONOMIC STABILITY: FOOD INSECURITY: WITHIN THE PAST 12 MONTHS, THE FOOD YOU BOUGHT JUST DIDN'T LAST AND YOU DIDN'T HAVE MONEY TO GET MORE.: NEVER TRUE

## 2025-07-28 SDOH — ECONOMIC STABILITY: TRANSPORTATION INSECURITY
IN THE PAST 12 MONTHS, HAS THE LACK OF TRANSPORTATION KEPT YOU FROM MEDICAL APPOINTMENTS OR FROM GETTING MEDICATIONS?: NO

## 2025-07-28 ASSESSMENT — PATIENT HEALTH QUESTIONNAIRE - PHQ9
1. LITTLE INTEREST OR PLEASURE IN DOING THINGS: NOT AT ALL
SUM OF ALL RESPONSES TO PHQ9 QUESTIONS 1 & 2: 0
1. LITTLE INTEREST OR PLEASURE IN DOING THINGS: NOT AT ALL
SUM OF ALL RESPONSES TO PHQ QUESTIONS 1-9: 0
2. FEELING DOWN, DEPRESSED OR HOPELESS: NOT AT ALL
SUM OF ALL RESPONSES TO PHQ QUESTIONS 1-9: 0
2. FEELING DOWN, DEPRESSED OR HOPELESS: NOT AT ALL
SUM OF ALL RESPONSES TO PHQ QUESTIONS 1-9: 0
SUM OF ALL RESPONSES TO PHQ QUESTIONS 1-9: 0

## 2025-07-30 ENCOUNTER — OFFICE VISIT (OUTPATIENT)
Dept: FAMILY MEDICINE CLINIC | Facility: CLINIC | Age: 45
End: 2025-07-30
Payer: COMMERCIAL

## 2025-07-30 VITALS
HEART RATE: 77 BPM | OXYGEN SATURATION: 97 % | DIASTOLIC BLOOD PRESSURE: 64 MMHG | SYSTOLIC BLOOD PRESSURE: 112 MMHG | RESPIRATION RATE: 16 BRPM | BODY MASS INDEX: 21.86 KG/M2 | HEIGHT: 65 IN | WEIGHT: 131.2 LBS

## 2025-07-30 DIAGNOSIS — M54.2 NECK PAIN: Primary | ICD-10-CM

## 2025-07-30 DIAGNOSIS — M54.9 MID BACK PAIN ON LEFT SIDE: ICD-10-CM

## 2025-07-30 DIAGNOSIS — G47.00 INSOMNIA, UNSPECIFIED TYPE: ICD-10-CM

## 2025-07-30 DIAGNOSIS — M54.50 LEFT-SIDED LOW BACK PAIN WITHOUT SCIATICA, UNSPECIFIED CHRONICITY: ICD-10-CM

## 2025-07-30 PROCEDURE — 99213 OFFICE O/P EST LOW 20 MIN: CPT | Performed by: FAMILY MEDICINE

## 2025-07-30 RX ORDER — DICLOFENAC SODIUM 75 MG/1
75 TABLET, DELAYED RELEASE ORAL 2 TIMES DAILY PRN
Qty: 60 TABLET | Refills: 0 | Status: SHIPPED | OUTPATIENT
Start: 2025-07-30

## 2025-07-30 SDOH — ECONOMIC STABILITY: FOOD INSECURITY: WITHIN THE PAST 12 MONTHS, YOU WORRIED THAT YOUR FOOD WOULD RUN OUT BEFORE YOU GOT MONEY TO BUY MORE.: NEVER TRUE

## 2025-07-30 SDOH — ECONOMIC STABILITY: FOOD INSECURITY: WITHIN THE PAST 12 MONTHS, THE FOOD YOU BOUGHT JUST DIDN'T LAST AND YOU DIDN'T HAVE MONEY TO GET MORE.: NEVER TRUE

## 2025-07-30 NOTE — PROGRESS NOTES
Willem Sanchez DO    The patient (or guardian, if applicable) and other individuals in attendance with the patient were advised that Artificial Intelligence will be utilized during this visit to record, process the conversation to generate a clinical note, and support improvement of the AI technology. The patient (or guardian, if applicable) and other individuals in attendance at the appointment consented to the use of AI, including the recording.